# Patient Record
Sex: FEMALE | Race: BLACK OR AFRICAN AMERICAN | NOT HISPANIC OR LATINO
[De-identification: names, ages, dates, MRNs, and addresses within clinical notes are randomized per-mention and may not be internally consistent; named-entity substitution may affect disease eponyms.]

---

## 2020-07-16 PROBLEM — Z86.69 HISTORY OF RETINAL TEAR: Status: RESOLVED | Noted: 2020-07-16 | Resolved: 2020-07-16

## 2020-07-17 PROBLEM — Z86.69 H/O CATARACT: Status: RESOLVED | Noted: 2020-07-17 | Resolved: 2020-07-17

## 2020-07-17 PROBLEM — Z80.0 FAMILY HISTORY OF MALIGNANT NEOPLASM OF COLON: Status: ACTIVE | Noted: 2020-07-17

## 2020-07-17 PROBLEM — Z80.3 FAMILY HISTORY OF MALIGNANT NEOPLASM OF BREAST: Status: ACTIVE | Noted: 2020-07-17

## 2020-07-17 PROBLEM — Z80.42 FAMILY HISTORY OF MALIGNANT NEOPLASM OF PROSTATE: Status: ACTIVE | Noted: 2020-07-17

## 2020-07-17 RX ORDER — DORZOLAMIDE HYDROCHLORIDE 20 MG/ML
2 SOLUTION OPHTHALMIC
Refills: 0 | Status: ACTIVE | COMMUNITY

## 2020-07-17 RX ORDER — LATANOPROST/PF 0.005 %
0.01 DROPS OPHTHALMIC (EYE)
Refills: 0 | Status: ACTIVE | COMMUNITY

## 2020-07-20 ENCOUNTER — APPOINTMENT (OUTPATIENT)
Dept: GYNECOLOGIC ONCOLOGY | Facility: CLINIC | Age: 85
End: 2020-07-20
Payer: MEDICARE

## 2020-07-20 VITALS
HEIGHT: 63 IN | DIASTOLIC BLOOD PRESSURE: 86 MMHG | WEIGHT: 162 LBS | SYSTOLIC BLOOD PRESSURE: 138 MMHG | BODY MASS INDEX: 28.7 KG/M2

## 2020-07-20 DIAGNOSIS — Z80.0 FAMILY HISTORY OF MALIGNANT NEOPLASM OF DIGESTIVE ORGANS: ICD-10-CM

## 2020-07-20 DIAGNOSIS — Z86.69 PERSONAL HISTORY OF OTHER DISEASES OF THE NERVOUS SYSTEM AND SENSE ORGANS: ICD-10-CM

## 2020-07-20 DIAGNOSIS — Z80.3 FAMILY HISTORY OF MALIGNANT NEOPLASM OF BREAST: ICD-10-CM

## 2020-07-20 DIAGNOSIS — Z80.42 FAMILY HISTORY OF MALIGNANT NEOPLASM OF PROSTATE: ICD-10-CM

## 2020-07-20 DIAGNOSIS — E03.9 HYPOTHYROIDISM, UNSPECIFIED: ICD-10-CM

## 2020-07-20 DIAGNOSIS — I10 ESSENTIAL (PRIMARY) HYPERTENSION: ICD-10-CM

## 2020-07-20 DIAGNOSIS — H04.129 DRY EYE SYNDROME OF UNSPECIFIED LACRIMAL GLAND: ICD-10-CM

## 2020-07-20 PROCEDURE — 36415 COLL VENOUS BLD VENIPUNCTURE: CPT

## 2020-07-20 PROCEDURE — 99204 OFFICE O/P NEW MOD 45 MIN: CPT

## 2020-07-22 LAB
CANCER AG125 SERPL-ACNC: 7 U/ML
CANCER AG19-9 SERPL-ACNC: 54 U/ML
CEA SERPL-MCNC: 2.3 NG/ML

## 2020-07-22 NOTE — HISTORY OF PRESENT ILLNESS
[FreeTextEntry1] : Problem List\par 1. Breast Cancer 2013 s/p lumpectomy Chemo and RT\par 2. Complex Right ovarian cyst w/ interval enlargement\par 3. Hx of hysterectomy 1969 for fibroids \par 4. Elevated CA 19-9 : 92 in 2016\par \par Previous Therapy\par 1. Pelvic US 1/2020-- compared to 2016 \par     a)right ovary- 4.7 x 3 x 3.3cm cyst with septation, previously 3.4 x 3.0 x 3.2cm\par     b) left ovary-  echogenic mass 3.5 x 3.2 x 3.3cm , previously 3.5 x 3.0 x 3.9cm\par 2.  1/2020 - 4 \par 3. MRI pelvis 6/2/20\par     a) right ovarian multilocular cysts with low risk for malignancy (O-RAD 3) - less than 5 %, measuring     4.7 x 3 x 3cm. Smooth septations without suspicious nodules. 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 \par \par 86 y/o w/ h/o breast cancer referred from [facility] - Dr. Marquez for known right ovarian complex cyst since 2016. Previously scheduled for surgery by Dr. Fernández but patient refused and wanted a second opinion. Then patient was seen at [facility] in 2016 and BSO surgery was scheduled and then cancelled pending GI evaluation and pt was found to have diverticulitis. Surgery was then rescheduled o 12/2019 but for unknown reasons did not occur. \par \par Pt currently denies any abdominal pain, abdominal bloating or vaginal bleeding. \par \par Health Maintenance\par Last mammogram: 2-3 years ago, normal. Pt has PCP appointment in 2 days and will get breast mammogram referral then as per patient's request. \par Colonoscopy- 5 years ago, normal , has a follow up appointment in Sept

## 2020-07-22 NOTE — PHYSICAL EXAM
[Normal] : Recto-Vaginal Exam: Normal [Fully active, able to carry on all pre-disease performance without restriction] : Status 0 - Fully active, able to carry on all pre-disease performance without restriction [Absent] : Uterus: Absent [Abnormal] : Adnexa(ae): Abnormal [___] : a [unfilled] ~Ucm right mass [de-identified] : small smooth nodule invagina, non friable on right side.  benign and smooth, mobile vagina.

## 2020-07-22 NOTE — DISCUSSION/SUMMARY
[Reviewed Radiology Report(s)] : Radiology reports were reviewed. [FreeTextEntry1] : probable benign finding in an elderly patient\par \par - Will repeat sonogram in 3 months\par - Tumor markers today, , CEA and CA 19-9\par - Will call patient with results

## 2020-10-19 ENCOUNTER — APPOINTMENT (OUTPATIENT)
Dept: GYNECOLOGIC ONCOLOGY | Facility: CLINIC | Age: 85
End: 2020-10-19
Payer: MEDICARE

## 2020-10-19 VITALS
DIASTOLIC BLOOD PRESSURE: 88 MMHG | HEIGHT: 63 IN | RESPIRATION RATE: 16 BRPM | OXYGEN SATURATION: 98 % | TEMPERATURE: 98 F | BODY MASS INDEX: 28 KG/M2 | HEART RATE: 68 BPM | WEIGHT: 158 LBS | SYSTOLIC BLOOD PRESSURE: 159 MMHG

## 2020-10-19 PROCEDURE — 99214 OFFICE O/P EST MOD 30 MIN: CPT

## 2020-10-19 NOTE — HISTORY OF PRESENT ILLNESS
[FreeTextEntry1] : Problem List\par 1. Breast Cancer 2013 s/p lumpectomy Chemo and RT\par 2. Complex Right ovarian cyst w/ interval enlargement\par 3. Hx of hysterectomy 1969 for fibroids \par 4. Elevated CA 19-9 : 92 in 2016\par \par Previous Therapy\par 1. Pelvic US 1/2020-- compared to 2016 \par     a)right ovary- 4.7 x 3 x 3.3cm cyst with septation, previously 3.4 x 3.0 x 3.2cm\par     b) left ovary-  echogenic mass 3.5 x 3.2 x 3.3cm , previously 3.5 x 3.0 x 3.9cm\par 2.  1/2020 - 4 \par 3. MRI pelvis 6/2/20\par     a) right ovarian multilocular cysts with low risk for malignancy (O-RAD 3) - less than 5 %, measuring     4.7 x 3 x 3cm. Smooth septations without suspicious nodules. 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 \par \par Here for follow up. Has not had repeat US.  Pt currently denies any abdominal pain, abdominal bloating or vaginal bleeding. \par \par Health Maintenance\par Last mammogram: 2-3 years ago, normal. Pt has PCP appointment in 2 days and will get breast mammogram referral then as per patient's request. \par Colonoscopy- 5 years ago, normal , has a follow up appointment in Sept

## 2020-10-19 NOTE — DISCUSSION/SUMMARY
[Reviewed Radiology Report(s)] : Radiology reports were reviewed. [FreeTextEntry1] : probable benign finding in an elderly patient\par \par - Will repeat sonogram now\par -Repeat tumor markers today\par - Will call patient with results

## 2020-10-19 NOTE — PHYSICAL EXAM
[Abnormal] : Adnexa(ae): Abnormal [Absent] : Uterus: Absent [___] : a [unfilled] ~Ucm right mass [Normal] : Anus and perineum: Normal sphincter tone, no masses, no prolapse. [Fully active, able to carry on all pre-disease performance without restriction] : Status 0 - Fully active, able to carry on all pre-disease performance without restriction [de-identified] : small smooth nodule invagina, non friable on right side.  benign and smooth, mobile vagina.

## 2020-10-23 LAB
CANCER AG125 SERPL-ACNC: 7 U/ML
CANCER AG19-9 SERPL-ACNC: 59 U/ML

## 2021-01-04 ENCOUNTER — APPOINTMENT (OUTPATIENT)
Dept: CT IMAGING | Facility: HOSPITAL | Age: 86
End: 2021-01-04

## 2021-01-04 ENCOUNTER — OUTPATIENT (OUTPATIENT)
Dept: OUTPATIENT SERVICES | Facility: HOSPITAL | Age: 86
LOS: 1 days | End: 2021-01-04
Payer: COMMERCIAL

## 2021-01-04 DIAGNOSIS — Z90.710 ACQUIRED ABSENCE OF BOTH CERVIX AND UTERUS: Chronic | ICD-10-CM

## 2021-01-04 PROCEDURE — 74177 CT ABD & PELVIS W/CONTRAST: CPT | Mod: 26

## 2021-01-04 PROCEDURE — 74177 CT ABD & PELVIS W/CONTRAST: CPT

## 2021-01-25 ENCOUNTER — APPOINTMENT (OUTPATIENT)
Dept: GYNECOLOGIC ONCOLOGY | Facility: CLINIC | Age: 86
End: 2021-01-25
Payer: MEDICARE

## 2021-01-25 ENCOUNTER — NON-APPOINTMENT (OUTPATIENT)
Age: 86
End: 2021-01-25

## 2021-01-25 VITALS
SYSTOLIC BLOOD PRESSURE: 191 MMHG | DIASTOLIC BLOOD PRESSURE: 92 MMHG | HEIGHT: 63 IN | OXYGEN SATURATION: 97 % | TEMPERATURE: 95.2 F | HEART RATE: 65 BPM | BODY MASS INDEX: 27.11 KG/M2 | WEIGHT: 153 LBS

## 2021-01-25 VITALS — SYSTOLIC BLOOD PRESSURE: 167 MMHG | DIASTOLIC BLOOD PRESSURE: 91 MMHG

## 2021-01-25 DIAGNOSIS — N83.201 UNSPECIFIED OVARIAN CYST, RIGHT SIDE: ICD-10-CM

## 2021-01-25 DIAGNOSIS — N83.8 OTHER NONINFLAMMATORY DISORDERS OF OVARY, FALLOPIAN TUBE AND BROAD LIGAMENT: ICD-10-CM

## 2021-01-25 PROCEDURE — 99214 OFFICE O/P EST MOD 30 MIN: CPT

## 2021-01-25 PROCEDURE — 99072 ADDL SUPL MATRL&STAF TM PHE: CPT

## 2021-01-25 NOTE — ASSESSMENT
[FreeTextEntry1] : Pt counseled that in setting of enlargement of bilateral ovarian masses, there is a concern for possible malignancy, especially given left ovarian mass has solid components. Recommendation at this time is for surgical management with bilateral oophorectomy. Patient expressed that she feels fine and does not want to proceed with surgery at this time given that she thinks that she only has a total of 15 years left to live and that her ovaries are not bothering her. Risks, benefits, and alternatives were discussed in detail and all pt questions answered. At this time per pt request, we will not proceed with surgery at this time. Given patient does not want intervention for pelvic masses, there is no further need for follow up imaging; she can continue with routine follow up with her primary GYN.

## 2021-01-25 NOTE — DISCUSSION/SUMMARY
[Reviewed Radiology Report(s)] : Radiology reports were reviewed. [FreeTextEntry1] : probable benign finding in an elderly patient however has grown over time with solid component and has elevated .\par \par -I recommend laparoscopic evaluation and assessement of this mass with BSO\par -the patient does not want any interevention\par -I explanined that it can continue to grow and become symptomatic and not amenable to laparoscopy and could also obscure her pelvic exams for rectal patholog\par -we also could be missing a malignancy\par -she understands but still does not want any intervention \par -thus she will not have any further imaging or follow up with me\par

## 2021-01-25 NOTE — HISTORY OF PRESENT ILLNESS
[FreeTextEntry1] : Problem List\par 1. Breast Cancer 2013 s/p lumpectomy Chemo and RT\par 2. Complex Right ovarian cyst w/ interval enlargement\par 3. Hx of hysterectomy 1969 for fibroids \par 4. Elevated CA 19-9 : 92 in 2016, \par \par Previous Therapy\par 1. Pelvic US 1/2020-- compared to 2016 \par     a)right ovary- 4.7 x 3 x 3.3cm cyst with septation, previously 3.4 x 3.0 x 3.2cm\par     b) left ovary-  echogenic mass 3.5 x 3.2 x 3.3cm , previously 3.5 x 3.0 x 3.9cm\par 2.  1/2020 - 4 \par 3. MRI pelvis 6/2/20\par     a) right ovarian multilocular cysts with low risk for malignancy (O-RAD 3) - less than 5 %, measuring     4.7 x 3 x 3cm. Smooth septations without suspicious nodules. 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 \par 4) CTAP 1/4/2021\par    a) Interval enlargement of solid L adnexal mass measuirng 5cm, previously 3.8 in 2016 probably fibroma/thecoma, interval enlargement of predominantly cystic mass in the r adnexal region 5.3cm (2.9 in 2016)\par \par Here for follow up to discuss CT results and persistently elevated . Feeling well. Denies constipation, early satiety, bloating, unintentional weight loss. Does not want surgery as she feels fine. The pain she iniitally had many years ago has gone away.\par \par Health Maintenance\par Last mammogram: 2-3 years ago, normal. Pt has PCP appointment in 2 days and will get breast mammogram referral then as per patient's request. \par Colonoscopy- 5 years ago, normal , has a follow up appointment in Sept

## 2021-01-25 NOTE — PHYSICAL EXAM
[Absent] : Uterus: Absent [Abnormal] : Adnexa(ae): Abnormal [___] : a [unfilled] ~Ucm right mass [Normal] : Recto-Vaginal Exam: Normal [Fully active, able to carry on all pre-disease performance without restriction] : Status 0 - Fully active, able to carry on all pre-disease performance without restriction [de-identified] : healed mlv incision [de-identified] : small smooth nodule in rightvagina, non friable on right side.  benign and smooth, mobile vagina.